# Patient Record
Sex: FEMALE | Race: WHITE | NOT HISPANIC OR LATINO | Employment: OTHER | URBAN - METROPOLITAN AREA
[De-identification: names, ages, dates, MRNs, and addresses within clinical notes are randomized per-mention and may not be internally consistent; named-entity substitution may affect disease eponyms.]

---

## 2024-07-29 ENCOUNTER — ANESTHESIA EVENT (OUTPATIENT)
Dept: PERIOP | Facility: HOSPITAL | Age: 79
End: 2024-07-29
Payer: MEDICARE

## 2024-07-29 ENCOUNTER — PRE-ADMISSION TESTING (OUTPATIENT)
Dept: PREADMISSION TESTING | Facility: HOSPITAL | Age: 79
End: 2024-07-29
Payer: MEDICARE

## 2024-07-29 VITALS
RESPIRATION RATE: 16 BRPM | BODY MASS INDEX: 23.9 KG/M2 | DIASTOLIC BLOOD PRESSURE: 77 MMHG | OXYGEN SATURATION: 99 % | SYSTOLIC BLOOD PRESSURE: 150 MMHG | TEMPERATURE: 97.9 F | WEIGHT: 140 LBS | HEART RATE: 62 BPM | HEIGHT: 64 IN

## 2024-07-29 LAB
ANION GAP SERPL CALCULATED.3IONS-SCNC: 11 MMOL/L (ref 5–15)
BUN SERPL-MCNC: 14 MG/DL (ref 8–23)
BUN/CREAT SERPL: 19.7 (ref 7–25)
CALCIUM SPEC-SCNC: 9.5 MG/DL (ref 8.6–10.5)
CHLORIDE SERPL-SCNC: 105 MMOL/L (ref 98–107)
CO2 SERPL-SCNC: 25 MMOL/L (ref 22–29)
CREAT SERPL-MCNC: 0.71 MG/DL (ref 0.57–1)
DEPRECATED RDW RBC AUTO: 43.3 FL (ref 37–54)
EGFRCR SERPLBLD CKD-EPI 2021: 87.7 ML/MIN/1.73
ERYTHROCYTE [DISTWIDTH] IN BLOOD BY AUTOMATED COUNT: 12.5 % (ref 12.3–15.4)
GLUCOSE SERPL-MCNC: 95 MG/DL (ref 65–99)
HCT VFR BLD AUTO: 42.5 % (ref 34–46.6)
HGB BLD-MCNC: 13.8 G/DL (ref 12–15.9)
MCH RBC QN AUTO: 30.9 PG (ref 26.6–33)
MCHC RBC AUTO-ENTMCNC: 32.5 G/DL (ref 31.5–35.7)
MCV RBC AUTO: 95.1 FL (ref 79–97)
PLATELET # BLD AUTO: 253 10*3/MM3 (ref 140–450)
PMV BLD AUTO: 10.8 FL (ref 6–12)
POTASSIUM SERPL-SCNC: 3.9 MMOL/L (ref 3.5–5.2)
QT INTERVAL: 419 MS
QTC INTERVAL: 405 MS
RBC # BLD AUTO: 4.47 10*6/MM3 (ref 3.77–5.28)
SODIUM SERPL-SCNC: 141 MMOL/L (ref 136–145)
WBC NRBC COR # BLD AUTO: 5.37 10*3/MM3 (ref 3.4–10.8)

## 2024-07-29 PROCEDURE — 80048 BASIC METABOLIC PNL TOTAL CA: CPT

## 2024-07-29 PROCEDURE — 85027 COMPLETE CBC AUTOMATED: CPT

## 2024-07-29 PROCEDURE — 93005 ELECTROCARDIOGRAM TRACING: CPT

## 2024-07-29 PROCEDURE — 36415 COLL VENOUS BLD VENIPUNCTURE: CPT

## 2024-07-29 RX ORDER — MULTIVIT WITH MINERALS/LUTEIN
250 TABLET ORAL DAILY
COMMUNITY

## 2024-07-29 RX ORDER — DIPHENOXYLATE HYDROCHLORIDE AND ATROPINE SULFATE 2.5; .025 MG/1; MG/1
1 TABLET ORAL DAILY
COMMUNITY

## 2024-07-29 RX ORDER — TRIAMCINOLONE ACETONIDE 55 UG/1
2 SPRAY, METERED NASAL DAILY PRN
COMMUNITY

## 2024-07-29 RX ORDER — GLUCOSAMINE/D3/BOSWELLIA SERRA 1500MG-400
1 TABLET ORAL DAILY
COMMUNITY

## 2024-07-29 RX ORDER — ESTRADIOL 0.1 MG/G
0.5 CREAM VAGINAL 2 TIMES WEEKLY
COMMUNITY
Start: 2024-05-06 | End: 2025-05-07

## 2024-07-29 RX ORDER — AMLODIPINE BESYLATE 5 MG/1
5 TABLET ORAL 2 TIMES DAILY
COMMUNITY
Start: 2024-06-19 | End: 2025-05-30

## 2024-07-29 RX ORDER — HYDROCHLOROTHIAZIDE 12.5 MG/1
1 TABLET ORAL DAILY
COMMUNITY
Start: 2024-02-21 | End: 2024-08-20

## 2024-07-29 RX ORDER — LEVOTHYROXINE SODIUM 75 MCG
75 TABLET ORAL
COMMUNITY
Start: 2024-02-26 | End: 2024-08-25

## 2024-07-29 RX ORDER — MULTIVITAMIN/IRON/FOLIC ACID 18MG-0.4MG
TABLET ORAL
COMMUNITY
End: 2024-07-29

## 2024-07-29 RX ORDER — OMEGA-3S/DHA/EPA/FISH OIL/D3 300MG-1000
400 CAPSULE ORAL DAILY
COMMUNITY

## 2024-07-29 RX ORDER — KETOCONAZOLE 20 MG/ML
1 SHAMPOO TOPICAL 3 TIMES WEEKLY
COMMUNITY
Start: 2024-07-20

## 2024-07-29 RX ORDER — GUAIFENESIN 600 MG/1
1200 TABLET, EXTENDED RELEASE ORAL DAILY PRN
COMMUNITY

## 2024-07-29 RX ORDER — ASPIRIN 81 MG/1
1 TABLET, CHEWABLE ORAL DAILY
COMMUNITY
Start: 2023-10-26 | End: 2024-10-26

## 2024-07-29 NOTE — ANESTHESIA PREPROCEDURE EVALUATION
Anesthesia Evaluation     Patient summary reviewed and Nursing notes reviewed   history of anesthetic complications:  prolonged sedation  NPO Solid Status: > 8 hours  NPO Liquid Status: > 2 hours           Airway   Mallampati: II  TM distance: >3 FB  Neck ROM: full  No difficulty expected  Dental - normal exam     Pulmonary - normal exam    breath sounds clear to auscultation  Cardiovascular - normal exam    ECG reviewed  Rhythm: regular  Rate: normal    (+) hypertension 2 medications or greater    ROS comment: Hx stroke in 10/21 due to probable PFO, positive bubble study by ECHO at [facility] in [place] in 10/21, closure not felt by cardiologist to be warranted at patient's age  PE comment: No murmurs heard    Neuro/Psych  (+) CVA residual symptoms, psychiatric history Anxiety    ROS Comment: Hx CVA in 10/21 felt due to a PFO (workup all done at [facility]),  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  GI/Hepatic/Renal/Endo    (+) renal disease- stones, thyroid problem hypothyroidism    Musculoskeletal     Abdominal  - normal exam   Substance History      OB/GYN      Comment: Uterovaginal prolapse      Other   arthritis,                   Anesthesia Plan    ASA 3     general     (Avoid bubbles in IV with hx CVA due to PFO in 2021, patient has had anesthesia since then without problems for cystoscopy and colonoscopy/consider TIVA or background Propofol drip for quicker emergence)  intravenous induction     Anesthetic plan, risks, benefits, and alternatives have been provided, discussed and informed consent has been obtained with: patient.      CODE STATUS:

## 2024-07-29 NOTE — DISCHARGE INSTRUCTIONS
Take the following medications the morning of surgery:  AMLODIPINE, SYNTHROID    ASK MD IF NEED TO STOP ASPIRIN    STOP SUPPLEMENTS/VITAMINS NOW UNTIL AFTER SURGERY    If you are on prescription narcotic pain medication to control your pain you may also take that medication the morning of surgery.    General Instructions:  Do not eat solid food after midnight the night before surgery.  You may drink clear liquids day of surgery but must stop at least one hour before your hospital arrival time.  It is beneficial for you to have a clear drink that contains carbohydrates the day of surgery.  We suggest a 12 to 20 ounce bottle of Gatorade or Powerade for non-diabetic patients or a 12 to 20 ounce bottle of G2 or Powerade Zero for diabetic patients. (Pediatric patients, are not advised to drink a 12 to 20 ounce carbohydrate drink)    Clear liquids are liquids you can see through.  Nothing red in color.     Plain water                                  Sports drinks  Sodas                                   Gelatin (Jell-O)  Fruit juices without pulp such as white grape juice and apple juice  Popsicles that contain no fruit or yogurt  Tea or coffee (no cream or milk added)  Gatorade / Powerade  G2 / Powerade Zero    Chicken / beef / pork / vegetable bullion / cubes or any formulation are not considered clear liquids and are not allowed.    Infants may have breast milk up to four hours before surgery.  Infants drinking formula may drink formula up to six hours before surgery.   Patients who avoid smoking, chewing tobacco and alcohol for 4 weeks prior to surgery have a reduced risk of post-operative complications.  Quit smoking as many days before surgery as you can.  Do not smoke, use chewing tobacco or drink alcohol the day of surgery.   If applicable bring your C-PAP/ BI-PAP machine in with you to preop day of surgery.  Bring any papers given to you in the doctor’s office.  Wear clean comfortable clothes.  Do not wear  contact lenses, false eyelashes or make-up.  Bring a case for your glasses.   Bring crutches or walker if applicable.  Remove all piercings.  Leave jewelry and any other valuables at home.  Hair extensions with metal clips must be removed prior to surgery.  The Pre-Admission Testing nurse will instruct you to bring medications if unable to obtain an accurate list in Pre-Admission Testing.      Preventing a Surgical Site Infection:  For 2 to 3 days before surgery, avoid shaving with a razor because the razor can irritate skin and make it easier to develop an infection.    Any areas of open skin can increase the risk of a post-operative wound infection by allowing bacteria to enter and travel throughout the body.  Notify your surgeon if you have any skin wounds / rashes even if it is not near the expected surgical site.  The area will need assessed to determine if surgery should be delayed until it is healed.  The night prior to surgery shower using a fresh bar of anti-bacterial soap (such as Dial) and clean washcloth.  Sleep in a clean bed with clean clothing.  Do not allow pets to sleep with you.  Shower on the morning of surgery using a fresh bar of anti-bacterial soap (such as Dial) and clean washcloth.  Dry with a clean towel and dress in clean clothing.  Ask your surgeon if you will be receiving antibiotics prior to surgery.  Make sure you, your family, and all healthcare providers clean their hands with soap and water or an alcohol based hand  before caring for you or your wound.    Day of surgery:  Your arrival time is approximately two hours before your scheduled surgery time.  Upon arrival, a Pre-op nurse and Anesthesiologist will review your health history, obtain vital signs, and answer questions you may have.  The only belongings needed at this time will be a list of your home medications and if applicable your C-PAP/BI-PAP machine.  A Pre-op nurse will start an IV and you may receive medication  in preparation for surgery, including something to help you relax.     Please be aware that surgery does come with discomfort.  We want to make every effort to control your discomfort so please discuss any uncontrolled symptoms with your nurse.   Your doctor will most likely have prescribed pain medications.      If you are going home after surgery you will receive individualized written care instructions before being discharged.  A responsible adult must drive you to and from the hospital on the day of your surgery and ideally stay with you through the night.   .  Discharge prescriptions can be filled by the hospital pharmacy during regular pharmacy hours.  If you are having surgery late in the day/evening your prescription may be e-prescribed to your pharmacy.  Please verify your pharmacy hours or chose a 24 hour pharmacy to avoid not having access to your prescription because your pharmacy has closed for the day.    If you are staying overnight following surgery, you will be transported to your hospital room following the recovery period.  [facility] has all private rooms.    If you have any questions please call Pre-Admission Testing at [phone].  Deductibles and co-payments are collected on the day of service. Please be prepared to pay the required co-pay, deductible or deposit on the day of service as defined by your plan.    Call your surgeon immediately if you experience any of the following symptoms:  Sore Throat  Shortness of Breath or difficulty breathing  Cough  Chills  Body soreness or muscle pain  Headache  Fever  New loss of taste or smell  Do not arrive for your surgery ill.  Your procedure will need to be rescheduled to another time.  You will need to call your physician before the day of surgery to avoid any unnecessary exposure to hospital staff as well as other patients.

## 2024-08-07 NOTE — DISCHARGE INSTRUCTIONS
Female Pelvic Medicine & Reconstructive Surgery (Urogynecology)     POST OPERATIVE INSTRUCTIONS & RECOVERY    You may have had reconstructive pelvic surgery and it is now your turn to play an important role in its success. We know that it will take about 12 weeks for the tissues that have been operated on to heal to 80% of their final strength. Then it may take months or even up to two years for this healing to be complete. Major risk factors for tearing down the surgical repair in the first weeks or months are constipation and heavy lifting. Our recommendations below are to try to prevent this from happening.  The staff at [facility] is committed to ensuring that your post-operative experience is as comfortable as possible. Please do not hesitate to call the office for any questions after recovery. Any questions regarding your surgery or post-operative recovery should be directed to the staff at [facility] rather than your Primary Care Physician (PCP). The following information will help answer the frequently asked questions and will help you understand some of the common experiences that may occur after your surgery.    What should I expect immediately after surgery?    Activity  Take it easy for the first few weeks after surgery; you may need assistance the first few days. Some people notice slight depression after surgery. This will resolve on its own, but please call us if it does not.  You may walk as much as you would like and go up and down stairs, although in the beginning you may need to take one step at a time. Avoid jogging, aerobics, swimming, and biking. Avoid heavy pushing or pulling, including vacuuming and lifting pets/children/grandchildren.  You can return to work 4 to 6 weeks after surgery depending on your requirements for lifting and standing.   Heavy lifting is a major risk factor for tearing down your repair. You should not lift anything heavier than a gallon of milk (8 to 9  pounds) for the first six weeks. You may gradually increase your lifting to 20 pounds six to twelve weeks after surgery.  You may drive your car as soon as you feel ready and can safely react/turn your body, but not while you are taking narcotic medication.    Nutrition  Resume your normal diet after surgery. Eat a well-balanced diet. Drink six to eight glasses of non-caffeinated beverages daily. Water is especially recommended.   Resume all of your prior medications once you are home unless otherwise instructed by your doctor.     Abdominal incision care  If you have an abdominal incision, clean the incision with water. Incisions for laparoscopy are usually ¼ to ½ inch in size. You may have 2 to 3 small incisions with one larger ¾ inch incision OR you may only have 3 to 4 small incisions.   The incisions will have absorbent sutures, steri-strips, band-aids and/or skin glue. The band-aids may be removed the day after surgery. The steri-strips can be kept on until they curl at the edges. The sutures will absorb by themselves. If your skin is closed with staples they must be removed 7-10 days after surgery. Call the office to schedule an appointment for this procedure.    Hygiene, Abdominal incision care, and Vaginal discharge  Shower as usual. If you have an abdominal incision, clean the incision with water. Do not bathe in the bathtub until you have been given permission, usually after you see your surgeon at your 6-week post-op appointment. Keep the incision area clean and dry. Do not use a dressing unless your incision is draining or irritated.  You may notice an abnormal discharge or drainage. It is normal for discharge to go from red to pink to yellow as your sutures dissolve. If the discharge is foul-smelling please call our office.  Keep the perineal area (between the vagina and the rectum) clean and dry. Clean after every bowel movement and each time you urinate. Rinsing with plain water or a sitz-bath may be  helpful.  Use maxi pads - not tampons - to absorb blood or discharge. Avoid douching. You may notice vaginal spotting for up to 6 weeks. If you have bleeding that is heavy enough to soak through two maxi pads for two hours in a row call our office. Keep track of when the bleeding began and how many pads you have used.    Bowel Movements  Constipation can cause severe pain that can get worse with increased amounts of medication. Narcotics can make you constipated. Although we recognize that, depending on the extent of your surgery you may need to use narcotics, please be aware that they can cause constipation.  Constipation is also a major risk factor in tearing down of the surgical repair. We want to avoid constipation. Preventing constipation is much easier than treating it once it happens.  Do not strain during bowel movements.  If you experience constipation, make sure you are drinking plenty of water and eating a high fiber diet. A high fiber diet includes fruits, vegetables, and bran. Your doctor may have also recommended a fiber supplement like Benefiber, Metamucil, Citrucel, or Fibercon. Take this as directed once you are home from the hospital.  If you commonly experience constipation,  over-the-counter MiraLAX (you can use a generic version of polyethylene glycol as well). Please take this daily. If your stool gets too loose while using MiraLAX you can decrease using the medication every other day or every third day. Adjust the MiraLAX dose according to what your need for a regular bowel movement without straining.  If you start to feel constipated you can take MiraLAX once in the morning and once at night until you have your first bowel movement. Alternatively, you may also use a mild laxative such as Milk of Magnesia or a stool softener such as Colace®. In addition, make sure that you are getting enough activity during the day. No prescription is required for these medications.     Voiding /  Urination  Empty your bladder every two to three hours while you are awake, whether you feel like you need to or not. Sometimes surgery can change your sensation of fullness and you may not feel like you need to void. We want to avoid your bladder becoming too full.  If you have any special bladder instructions, you will be given them in the hospital. If you are unsure about these instructions call the office.  Call the office if you begin to experience any bladder problems. These problems may include urgency, frequency or pain with urination.     Sexual Weir  You should not have intercourse for a minimum of 6 weeks after surgery. Your doctor will tell you when you can resume sexual intercourse at your post-op visit.  You should not put anything in the vagina for six weeks after surgery. The only exceptions are if your doctor told you to use vaginal estrogen cream or pills. If you are using vaginal estrogen cream or pills, you can resume using those when you get home.    Fever  If you feel feverish or having shaking chills, take your temperature.  If your temperature is 100.4°F twice in a row, or if your temperature is 101°F or higher one time, call our office. This may mean that you have an infection and we will want to know about that.    Pain control  Most women will require pain medication after surgery. If you are comfortable you will be a little more active, which will help recovery.  Follow your doctor's instructions regarding pain medication. Because the narcotics can cause constipation, we encourage you to take over the counter medications like Tylenol (generic name: acetaminophen) or ibuprofen when you are able. If you are unsure if you are able to take these medications then ask your doctor about using them before your use it. Make sure that you do not take more than the recommended daily limit.   You may use a heating pad on your abdomen to relieve gas pain.      Please notify our office if any of  the following occurs:  Increased pain, redness, discharge, hardness, or swelling at the incision.  Pain or burning with urination  Vaginal bleeding that soaks more than 1 maxi pad per hour for two hours in a row  Fever greater than 101°F   Persistent nausea, vomiting or inability to have bowel movements.  Shortness of breath, calf pain or swelling in your extremities    Please call the office to schedule your post-operative visit for about 6 weeks after your surgery. You may also see your doctor again about 3 to 6 months and/or 1 year after your surgery. Additional appointments can and will be made based upon need.     **If you leave the hospital with a bladder catheter in place call the office to schedule an appointment to take place 1 week after your surgery **    Please call with any questions or concerns.   [phone] Monday to Friday 8AM to 5PM  or [phone] after work hours and during holidays    Dr. Walter Madrid          Scopolamine Patch  This patch has been applied to the skin behind one of your ears.  It may stay in place up to 24 hours. You may remove it at any time after your surgery; however, it should be removed after you are up and walking around the next day.  This medicine reduces stomach upset. Side effects may include: dry mouth, dizziness, sleepiness, constipation, or upset stomach.  An allergy would show up as: a rash, itching, wheezing or shortness of breath.  Follow these instructions:  Do not drink alcohol, drive or operate machinery while taking this medicine.  Wear only 1 patch at a time. You can leave the patch on for up to 24 hours.  When you remove the patch, fold it in half with the sticky sides together and throw it away. Wash your hands and the area under the patch.  Do not touch your eye with your hand if it has touched the patch.  Wash your hands well before and after touching the patch.  Sit or stand slowly to avoid dizziness.  Call your doctor if you  have:  Any sign of allergy  No relief  Trouble passing urine  Any new or severe symptoms

## 2024-08-07 NOTE — H&P
"[facility]   HISTORY AND PHYSICAL    Patient Name: Jerilyn Villela  : 1945  MRN: [id number]  Primary Care Physician:  Bertha Hernández MD  Date of admission: (Not on file)    Subjective   Subjective     Chief Complaint: here for surgery     History of Present Illness  HPI:   Jerilyn Villela is a 79 y.o.  female with POP-Q Stage 2 anterior predominant uterovaginal prolapse and JAY here for surgery. She is scheduled for a total vaginal hysterectomy, high USLS, anterior colporrhaphy, posterior colporrhaphy, retropubic synthetic midurethral sling, and cystoscopy today at [facility].     Medical history is significant for Stroke (10/2021, No residual symptoms - On ASA), HTN, HLD, Patent Foramen Ovale, Knee pain, Hypothyroidism, Diverticulosis.   Surgical history is significant for BTL (), Lithotripsy (), Left Meniscus Surgery (), Sinusitis Surgery ().      Changes to health history since last visit: None      Pap history: patient does not recall when last pap was, no history of abnormal pap smears   Imaging: \"none\"   EMB: \" n/a\"   Other Clearance: \" n/a\"     [facility]25/24[facility]  POP-Q Stage II Prolapse, using 4 Dish Pessary      Review of Systems   Constitutional: Negative.    HENT: Negative.     Eyes: Negative.    Respiratory: Negative.     Cardiovascular: Negative.    Gastrointestinal: Negative.    All other systems reviewed and are negative.       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 Otherwise pertinent FHx was reviewed and not pertinent to current issue.    Social History:  reports that she has never smoked. She has never used smokeless tobacco. She reports current alcohol use. She reports that she does not currently use drugs.    Home Medications:  Biotin, Potassium Chloride, Triamcinolone Acetonide, Zinc Oxide, amLODIPine, aspirin, cholecalciferol, estradiol, guaiFENesin, hydroCHLOROthiazide, ketoconazole, levothyroxine, multivitamin, and vitamin C    Allergies:  Allergies   Allergen Reactions    Lisinopril Other (See Comments)     Throat tightness    Penicillins Rash     07/12/2005-PENICILLIN       Objective    Objective     Vitals:      General:  Patient is a healthy appearing female who is well developed, well nourished in no acute distress.    Eyes:  Pupils equal, round. Sclera anicteric.   HEENT:  Normocephalic, atraumatic.    Chest:  Lungs clear to auscultation bilaterally. Normal respiratory effort.   Heart: Regular rate and rhythm. Normal peripheral vascular exam.   Abdomen:  Soft and non-tender, no masses, no hernia. No CVA tenderness. Tenderness to palpation medial to ASIS: No. No suprapubic tenderness to palpation. Location of scars: none  BACK: No SI joint tenderness.   Skin: No rashes or lesions.   Musculoskeletal:  Normal strength. No edema.   Neurologic:  Alert, nonfocal.   Psychiatric:  Normal affect and mood, oriented x 3.      Genitourinary Exam:    External Genitalia:  Normal appearance, no lesions.     Urethral Meatus: normal size and location.   Urethra:  No masses or tenderness.   Bladder:  No masses or tenderness.   Vagina: atrophic changes noted.   Cervix: prolapse see POPQ.  Uterus: prolapse see POPQ.   Adnexa/Parametrial:  No palpable masses, no tenderness.    Perineal Sensation:  normal.   Anal [place]: present.   Rectal Exam: not performed.   JAY was not demonstrable with a cough stress test in the lithotomy position, approximately 10 minutes after her last spontaneous void.      POPQ Examination: 2024     Aa  +1     Ba  +1    C  -3   ------------------------------------   GH  4    PB  3    TVL 9   ------------------------------------   Ap  0     Bp  0    D  -4      POP-Q Stage 2     Pelvic muscle (obturator internus & levator ani) assessment   Retropubic L: 0/10   Retropubic R: 0/10   Right OI: 0/10   Right LA: 0/10   Left LA: 0/10   Left OI:  0/10      Pelvic Floor Contraction Strength (Oxford Scale) Muscle Strength Grading   Grade 3: Moderate contraction with some squeeze and lift ability      The sensitive parts of the examination were performed with Logan Hernandez CMA as a chaperone.        PVR  Voided Volume: 39 ml   PVR: 0 ml by bladder scan        Assessment & Plan   Assessment / Plan        ASSESSMENT:   Jerilyn Villela is a 79 y.o.  female with POP-Q Stage 2 anterior predominant uterovaginal prolapse and stress urinary incontinence.      PLAN:   Patient scheduled for total vaginal hysterectomy, possible bilateral salpingoophorectomy, high USLS, anterior colporrhaphy, posterior colporrhaphy, retropubic synthetic midurethral sling, and cystoscopy.   -Today she was allowed the opportunity to ask and discuss all of her questions and concerns related to before, during, and after surgery.  -Post operative expectations and restrictions reviewed in detail. Handout provided to patient to take home for reference.  -We discussed surgical risks, which include, but are not limited to, pain, bleeding, infection, injury to nearby organs including bowel, bladder, ureters, urethra, nerves and blood vessels, as well as the possibility of persistent or recurrent symptoms which may require future surgery.   -We discussed additional surgical risks of possible large abdominal incision,  prolonged hospitalization, prolonged catheterization, deep vein thrombosis, and death.  -We further discussed the risk of postoperative dyspareunia or pelvic pain, and the potential for postoperative voiding dysfunction. Specifically, we discussed the risk of persistent or recurrent postoperative incontinence, or alternately postoperative urinary retention, either of which may require future surgical correction. Some pelvic surgeries have a risk of severe postoperative pelvic pain that can last several weeks for which may require removal of the suture or undoing of the surgery.   -Discussed the possibility of need for prajapati catheter post operatively if the patient is unable to void completely after surgery.   -If post operative voiding dysfunction after sling does not improve she may need urethrolysis or possible take down of the sling.  -With vaginal surgery scarring, narrowing, or shortening of the vagina may occur.  -With respect to permanent polypropylene mesh placement, we reviewed the recent FDA communications and the ~4% risk of a mesh-related complication related to this procedure, which could include mesh erosion into the vagina, bladder, urethra, ureters, or bowel, or alternately pelvic pain or dyspareunia, which may require future surgery. Surgery for any complications may or may not correct the complication. The patient was amenable to these risks, and wishes to proceed.   -Patient has been given a copy of mesh information from the .  -Bowel function is unpredictable postoperatively; surgery may help, not change, or even worsen any present symptoms. In the setting of prior constipation, we recommend starting a bowel regimen prior to surgery.  -FPMRS post operative instructions/hand out provided to patient.  -Consents were signed. She is to meet with PAT for anesthesia counseling and consent.      Krystyna Nesbitt MD  Attending: Dr. Walter Fuentes MD  I have reviewed the attached history and  physical and there are no updates or changes to history and or physical.

## 2024-08-08 ENCOUNTER — ANESTHESIA (OUTPATIENT)
Dept: PERIOP | Facility: HOSPITAL | Age: 79
End: 2024-08-08
Payer: MEDICARE

## 2024-08-08 ENCOUNTER — HOSPITAL ENCOUNTER (OUTPATIENT)
Facility: HOSPITAL | Age: 79
Setting detail: OBSERVATION
Discharge: HOME | End: 2024-08-09
Attending: OBSTETRICS & GYNECOLOGY | Admitting: STUDENT IN AN ORGANIZED HEALTH CARE EDUCATION/TRAINING PROGRAM
Payer: MEDICARE

## 2024-08-08 DIAGNOSIS — G89.18 ACUTE POSTOPERATIVE PAIN: Primary | ICD-10-CM

## 2024-08-08 DIAGNOSIS — N81.4 UTEROVAGINAL PROLAPSE: ICD-10-CM

## 2024-08-08 LAB
ABO GROUP BLD: NORMAL
ANION GAP SERPL CALCULATED.3IONS-SCNC: 15 MMOL/L (ref 5–15)
BASOPHILS # BLD AUTO: 0.02 10*3/MM3 (ref 0–0.2)
BASOPHILS NFR BLD AUTO: 0.2 % (ref 0–1.5)
BLD GP AB SCN SERPL QL: POSITIVE
BLD GP AB SCN SERPL QL: POSITIVE
BUN SERPL-MCNC: 6 MG/DL (ref 8–23)
BUN/CREAT SERPL: 10.5 (ref 7–25)
CALCIUM SPEC-SCNC: 8.3 MG/DL (ref 8.6–10.5)
CHLORIDE SERPL-SCNC: 104 MMOL/L (ref 98–107)
CO2 SERPL-SCNC: 21 MMOL/L (ref 22–29)
CREAT SERPL-MCNC: 0.57 MG/DL (ref 0.57–1)
DAT C3: NEGATIVE
DAT IGG-SP REAG RBC-IMP: POSITIVE
DAT POLY-SP REAG RBC QL: POSITIVE
DEPRECATED RDW RBC AUTO: 40.3 FL (ref 37–54)
EGFRCR SERPLBLD CKD-EPI 2021: 93.7 ML/MIN/1.73
EOSINOPHIL # BLD AUTO: 0 10*3/MM3 (ref 0–0.4)
EOSINOPHIL NFR BLD AUTO: 0 % (ref 0.3–6.2)
ERYTHROCYTE [DISTWIDTH] IN BLOOD BY AUTOMATED COUNT: 11.9 % (ref 12.3–15.4)
GLUCOSE SERPL-MCNC: 145 MG/DL (ref 65–99)
HCT VFR BLD AUTO: 36.7 % (ref 34–46.6)
HGB BLD-MCNC: 12.6 G/DL (ref 12–15.9)
IMM GRANULOCYTES # BLD AUTO: 0.06 10*3/MM3 (ref 0–0.05)
IMM GRANULOCYTES NFR BLD AUTO: 0.5 % (ref 0–0.5)
LYMPHOCYTES # BLD AUTO: 0.25 10*3/MM3 (ref 0.7–3.1)
LYMPHOCYTES NFR BLD AUTO: 2 % (ref 19.6–45.3)
MCH RBC QN AUTO: 32 PG (ref 26.6–33)
MCHC RBC AUTO-ENTMCNC: 34.3 G/DL (ref 31.5–35.7)
MCV RBC AUTO: 93.1 FL (ref 79–97)
MONOCYTES # BLD AUTO: 0.31 10*3/MM3 (ref 0.1–0.9)
MONOCYTES NFR BLD AUTO: 2.5 % (ref 5–12)
NEUTROPHILS NFR BLD AUTO: 11.98 10*3/MM3 (ref 1.7–7)
NEUTROPHILS NFR BLD AUTO: 94.8 % (ref 42.7–76)
NRBC BLD AUTO-RTO: 0 /100 WBC (ref 0–0.2)
PLATELET # BLD AUTO: 238 10*3/MM3 (ref 140–450)
PMV BLD AUTO: 10.9 FL (ref 6–12)
POTASSIUM SERPL-SCNC: 3.3 MMOL/L (ref 3.5–5.2)
RBC # BLD AUTO: 3.94 10*6/MM3 (ref 3.77–5.28)
RH BLD: POSITIVE
SODIUM SERPL-SCNC: 140 MMOL/L (ref 136–145)
T&S EXPIRATION DATE: NORMAL
WARM AUTOANTIBODY: NORMAL
WBC NRBC COR # BLD AUTO: 12.62 10*3/MM3 (ref 3.4–10.8)

## 2024-08-08 PROCEDURE — 25010000002 HYDROMORPHONE PER 4 MG: Performed by: NURSE ANESTHETIST, CERTIFIED REGISTERED

## 2024-08-08 PROCEDURE — 86921 COMPATIBILITY TEST INCUBATE: CPT

## 2024-08-08 PROCEDURE — 25010000002 PROPOFOL 10 MG/ML EMULSION: Performed by: NURSE ANESTHETIST, CERTIFIED REGISTERED

## 2024-08-08 PROCEDURE — A9270 NON-COVERED ITEM OR SERVICE: HCPCS | Performed by: STUDENT IN AN ORGANIZED HEALTH CARE EDUCATION/TRAINING PROGRAM

## 2024-08-08 PROCEDURE — 63710000001 ACETAMINOPHEN EXTRA STRENGTH 500 MG TABLET: Performed by: STUDENT IN AN ORGANIZED HEALTH CARE EDUCATION/TRAINING PROGRAM

## 2024-08-08 PROCEDURE — 25010000002 KETOROLAC TROMETHAMINE PER 15 MG: Performed by: OBSTETRICS & GYNECOLOGY

## 2024-08-08 PROCEDURE — 63710000001 FAMOTIDINE 20 MG TABLET: Performed by: STUDENT IN AN ORGANIZED HEALTH CARE EDUCATION/TRAINING PROGRAM

## 2024-08-08 PROCEDURE — 25010000002 FENTANYL CITRATE (PF) 50 MCG/ML SOLUTION: Performed by: NURSE ANESTHETIST, CERTIFIED REGISTERED

## 2024-08-08 PROCEDURE — C1758 CATHETER, URETERAL: HCPCS | Performed by: OBSTETRICS & GYNECOLOGY

## 2024-08-08 PROCEDURE — 88307 TISSUE EXAM BY PATHOLOGIST: CPT | Performed by: OBSTETRICS & GYNECOLOGY

## 2024-08-08 PROCEDURE — 86880 COOMBS TEST DIRECT: CPT | Performed by: STUDENT IN AN ORGANIZED HEALTH CARE EDUCATION/TRAINING PROGRAM

## 2024-08-08 PROCEDURE — 63710000001 POTASSIUM CHLORIDE 10 MEQ TABLET CONTROLLED-RELEASE: Performed by: OBSTETRICS & GYNECOLOGY

## 2024-08-08 PROCEDURE — 86901 BLOOD TYPING SEROLOGIC RH(D): CPT | Performed by: STUDENT IN AN ORGANIZED HEALTH CARE EDUCATION/TRAINING PROGRAM

## 2024-08-08 PROCEDURE — 25010000002 ONDANSETRON PER 1 MG: Performed by: NURSE ANESTHETIST, CERTIFIED REGISTERED

## 2024-08-08 PROCEDURE — G0378 HOSPITAL OBSERVATION PER HR: HCPCS

## 2024-08-08 PROCEDURE — 25810000003 LACTATED RINGERS PER 1000 ML: Performed by: NURSE ANESTHETIST, CERTIFIED REGISTERED

## 2024-08-08 PROCEDURE — 80048 BASIC METABOLIC PNL TOTAL CA: CPT | Performed by: STUDENT IN AN ORGANIZED HEALTH CARE EDUCATION/TRAINING PROGRAM

## 2024-08-08 PROCEDURE — C1771 REP DEV, URINARY, W/SLING: HCPCS | Performed by: OBSTETRICS & GYNECOLOGY

## 2024-08-08 PROCEDURE — A9270 NON-COVERED ITEM OR SERVICE: HCPCS | Performed by: OBSTETRICS & GYNECOLOGY

## 2024-08-08 PROCEDURE — 25810000003 LACTATED RINGERS PER 1000 ML: Performed by: ANESTHESIOLOGY

## 2024-08-08 PROCEDURE — 25010000002 DEXAMETHASONE PER 1 MG: Performed by: NURSE ANESTHETIST, CERTIFIED REGISTERED

## 2024-08-08 PROCEDURE — 25010000002 EPINEPHRINE PER 0.1 MG: Performed by: OBSTETRICS & GYNECOLOGY

## 2024-08-08 PROCEDURE — 86920 COMPATIBILITY TEST SPIN: CPT

## 2024-08-08 PROCEDURE — 86870 RBC ANTIBODY IDENTIFICATION: CPT | Performed by: STUDENT IN AN ORGANIZED HEALTH CARE EDUCATION/TRAINING PROGRAM

## 2024-08-08 PROCEDURE — 25010000002 GLYCOPYRROLATE 0.2 MG/ML SOLUTION: Performed by: NURSE ANESTHETIST, CERTIFIED REGISTERED

## 2024-08-08 PROCEDURE — 25010000002 SUGAMMADEX 200 MG/2ML SOLUTION: Performed by: NURSE ANESTHETIST, CERTIFIED REGISTERED

## 2024-08-08 PROCEDURE — 25010000002 CEFOXITIN PER 1 G: Performed by: NURSE ANESTHETIST, CERTIFIED REGISTERED

## 2024-08-08 PROCEDURE — 86922 COMPATIBILITY TEST ANTIGLOB: CPT

## 2024-08-08 PROCEDURE — 86850 RBC ANTIBODY SCREEN: CPT | Performed by: STUDENT IN AN ORGANIZED HEALTH CARE EDUCATION/TRAINING PROGRAM

## 2024-08-08 PROCEDURE — 25010000002 CEFOXITIN PER 1 G: Performed by: STUDENT IN AN ORGANIZED HEALTH CARE EDUCATION/TRAINING PROGRAM

## 2024-08-08 PROCEDURE — 85025 COMPLETE CBC W/AUTO DIFF WBC: CPT | Performed by: OBSTETRICS & GYNECOLOGY

## 2024-08-08 PROCEDURE — 86900 BLOOD TYPING SEROLOGIC ABO: CPT | Performed by: STUDENT IN AN ORGANIZED HEALTH CARE EDUCATION/TRAINING PROGRAM

## 2024-08-08 DEVICE — TRANSVAGINAL MID-URETHRAL SLING
Type: IMPLANTABLE DEVICE | Site: VAGINA | Status: FUNCTIONAL
Brand: ADVANTAGE FIT™  SYSTEM

## 2024-08-08 RX ORDER — KETOROLAC TROMETHAMINE 15 MG/ML
15 INJECTION, SOLUTION INTRAMUSCULAR; INTRAVENOUS ONCE
Status: COMPLETED | OUTPATIENT
Start: 2024-08-08 | End: 2024-08-08

## 2024-08-08 RX ORDER — SODIUM CHLORIDE, SODIUM LACTATE, POTASSIUM CHLORIDE, CALCIUM CHLORIDE 600; 310; 30; 20 MG/100ML; MG/100ML; MG/100ML; MG/100ML
125 INJECTION, SOLUTION INTRAVENOUS CONTINUOUS
Status: DISCONTINUED | OUTPATIENT
Start: 2024-08-08 | End: 2024-08-09 | Stop reason: HOSPADM

## 2024-08-08 RX ORDER — FENTANYL CITRATE 50 UG/ML
50 INJECTION, SOLUTION INTRAMUSCULAR; INTRAVENOUS ONCE AS NEEDED
Status: DISCONTINUED | OUTPATIENT
Start: 2024-08-08 | End: 2024-08-08 | Stop reason: HOSPADM

## 2024-08-08 RX ORDER — MIDAZOLAM HYDROCHLORIDE 1 MG/ML
0.5 INJECTION INTRAMUSCULAR; INTRAVENOUS
Status: DISCONTINUED | OUTPATIENT
Start: 2024-08-08 | End: 2024-08-08 | Stop reason: HOSPADM

## 2024-08-08 RX ORDER — SODIUM CHLORIDE 0.9 % (FLUSH) 0.9 %
10 SYRINGE (ML) INJECTION EVERY 12 HOURS SCHEDULED
Status: DISCONTINUED | OUTPATIENT
Start: 2024-08-08 | End: 2024-08-09 | Stop reason: HOSPADM

## 2024-08-08 RX ORDER — ROCURONIUM BROMIDE 10 MG/ML
INJECTION, SOLUTION INTRAVENOUS AS NEEDED
Status: DISCONTINUED | OUTPATIENT
Start: 2024-08-08 | End: 2024-08-08 | Stop reason: SURG

## 2024-08-08 RX ORDER — PROMETHAZINE HYDROCHLORIDE 25 MG/1
25 SUPPOSITORY RECTAL ONCE AS NEEDED
Status: DISCONTINUED | OUTPATIENT
Start: 2024-08-08 | End: 2024-08-08 | Stop reason: HOSPADM

## 2024-08-08 RX ORDER — GLYCOPYRROLATE 0.2 MG/ML
INJECTION INTRAMUSCULAR; INTRAVENOUS AS NEEDED
Status: DISCONTINUED | OUTPATIENT
Start: 2024-08-08 | End: 2024-08-08 | Stop reason: SURG

## 2024-08-08 RX ORDER — FAMOTIDINE 10 MG/ML
20 INJECTION, SOLUTION INTRAVENOUS ONCE
Status: DISCONTINUED | OUTPATIENT
Start: 2024-08-08 | End: 2024-08-08 | Stop reason: HOSPADM

## 2024-08-08 RX ORDER — LIDOCAINE HYDROCHLORIDE 10 MG/ML
0.5 INJECTION, SOLUTION INFILTRATION; PERINEURAL ONCE AS NEEDED
Status: DISCONTINUED | OUTPATIENT
Start: 2024-08-08 | End: 2024-08-08 | Stop reason: HOSPADM

## 2024-08-08 RX ORDER — SODIUM CHLORIDE 0.9 % (FLUSH) 0.9 %
3-10 SYRINGE (ML) INJECTION AS NEEDED
Status: DISCONTINUED | OUTPATIENT
Start: 2024-08-08 | End: 2024-08-08 | Stop reason: HOSPADM

## 2024-08-08 RX ORDER — HYDROCODONE BITARTRATE AND ACETAMINOPHEN 7.5; 325 MG/1; MG/1
1 TABLET ORAL EVERY 4 HOURS PRN
Status: DISCONTINUED | OUTPATIENT
Start: 2024-08-08 | End: 2024-08-08 | Stop reason: HOSPADM

## 2024-08-08 RX ORDER — DROPERIDOL 2.5 MG/ML
0.62 INJECTION, SOLUTION INTRAMUSCULAR; INTRAVENOUS
Status: DISCONTINUED | OUTPATIENT
Start: 2024-08-08 | End: 2024-08-08 | Stop reason: HOSPADM

## 2024-08-08 RX ORDER — LABETALOL HYDROCHLORIDE 5 MG/ML
5 INJECTION, SOLUTION INTRAVENOUS
Status: DISCONTINUED | OUTPATIENT
Start: 2024-08-08 | End: 2024-08-08 | Stop reason: HOSPADM

## 2024-08-08 RX ORDER — NALOXONE HCL 0.4 MG/ML
0.4 VIAL (ML) INJECTION
Status: DISCONTINUED | OUTPATIENT
Start: 2024-08-08 | End: 2024-08-09 | Stop reason: HOSPADM

## 2024-08-08 RX ORDER — CEFOXITIN 2 G/1
INJECTION, POWDER, FOR SOLUTION INTRAVENOUS AS NEEDED
Status: DISCONTINUED | OUTPATIENT
Start: 2024-08-08 | End: 2024-08-08 | Stop reason: SURG

## 2024-08-08 RX ORDER — DIPHENHYDRAMINE HYDROCHLORIDE 50 MG/ML
12.5 INJECTION INTRAMUSCULAR; INTRAVENOUS
Status: DISCONTINUED | OUTPATIENT
Start: 2024-08-08 | End: 2024-08-08 | Stop reason: HOSPADM

## 2024-08-08 RX ORDER — DEXAMETHASONE SODIUM PHOSPHATE 4 MG/ML
INJECTION, SOLUTION INTRA-ARTICULAR; INTRALESIONAL; INTRAMUSCULAR; INTRAVENOUS; SOFT TISSUE AS NEEDED
Status: DISCONTINUED | OUTPATIENT
Start: 2024-08-08 | End: 2024-08-08 | Stop reason: SURG

## 2024-08-08 RX ORDER — SODIUM CHLORIDE 0.9 % (FLUSH) 0.9 %
10 SYRINGE (ML) INJECTION AS NEEDED
Status: DISCONTINUED | OUTPATIENT
Start: 2024-08-08 | End: 2024-08-09 | Stop reason: HOSPADM

## 2024-08-08 RX ORDER — DEXTROSE MONOHYDRATE 25 G/50ML
INJECTION, SOLUTION INTRAVENOUS AS NEEDED
Status: DISCONTINUED | OUTPATIENT
Start: 2024-08-08 | End: 2024-08-08 | Stop reason: HOSPADM

## 2024-08-08 RX ORDER — HYDROMORPHONE HYDROCHLORIDE 1 MG/ML
0.25 INJECTION, SOLUTION INTRAMUSCULAR; INTRAVENOUS; SUBCUTANEOUS
Status: DISCONTINUED | OUTPATIENT
Start: 2024-08-08 | End: 2024-08-08 | Stop reason: HOSPADM

## 2024-08-08 RX ORDER — SODIUM CHLORIDE 0.9 % (FLUSH) 0.9 %
3 SYRINGE (ML) INJECTION EVERY 12 HOURS SCHEDULED
Status: DISCONTINUED | OUTPATIENT
Start: 2024-08-08 | End: 2024-08-08 | Stop reason: HOSPADM

## 2024-08-08 RX ORDER — FENTANYL CITRATE 50 UG/ML
25 INJECTION, SOLUTION INTRAMUSCULAR; INTRAVENOUS
Status: DISCONTINUED | OUTPATIENT
Start: 2024-08-08 | End: 2024-08-08 | Stop reason: HOSPADM

## 2024-08-08 RX ORDER — ONDANSETRON 2 MG/ML
INJECTION INTRAMUSCULAR; INTRAVENOUS AS NEEDED
Status: DISCONTINUED | OUTPATIENT
Start: 2024-08-08 | End: 2024-08-08 | Stop reason: SURG

## 2024-08-08 RX ORDER — IPRATROPIUM BROMIDE AND ALBUTEROL SULFATE 2.5; .5 MG/3ML; MG/3ML
3 SOLUTION RESPIRATORY (INHALATION) ONCE AS NEEDED
Status: DISCONTINUED | OUTPATIENT
Start: 2024-08-08 | End: 2024-08-08 | Stop reason: HOSPADM

## 2024-08-08 RX ORDER — LIDOCAINE HYDROCHLORIDE 20 MG/ML
INJECTION, SOLUTION INFILTRATION; PERINEURAL AS NEEDED
Status: DISCONTINUED | OUTPATIENT
Start: 2024-08-08 | End: 2024-08-08 | Stop reason: SURG

## 2024-08-08 RX ORDER — SODIUM CHLORIDE, SODIUM LACTATE, POTASSIUM CHLORIDE, CALCIUM CHLORIDE 600; 310; 30; 20 MG/100ML; MG/100ML; MG/100ML; MG/100ML
INJECTION, SOLUTION INTRAVENOUS CONTINUOUS PRN
Status: DISCONTINUED | OUTPATIENT
Start: 2024-08-08 | End: 2024-08-08 | Stop reason: SURG

## 2024-08-08 RX ORDER — ONDANSETRON 2 MG/ML
4 INJECTION INTRAMUSCULAR; INTRAVENOUS ONCE AS NEEDED
Status: COMPLETED | OUTPATIENT
Start: 2024-08-08 | End: 2024-08-08

## 2024-08-08 RX ORDER — SODIUM CHLORIDE, SODIUM LACTATE, POTASSIUM CHLORIDE, CALCIUM CHLORIDE 600; 310; 30; 20 MG/100ML; MG/100ML; MG/100ML; MG/100ML
9 INJECTION, SOLUTION INTRAVENOUS CONTINUOUS
Status: DISCONTINUED | OUTPATIENT
Start: 2024-08-08 | End: 2024-08-08

## 2024-08-08 RX ORDER — OXYCODONE HYDROCHLORIDE 5 MG/1
5 TABLET ORAL EVERY 8 HOURS PRN
Qty: 12 TABLET | Refills: 0 | Status: SHIPPED | OUTPATIENT
Start: 2024-08-08 | End: 2025-08-08

## 2024-08-08 RX ORDER — PROMETHAZINE HYDROCHLORIDE 25 MG/1
25 TABLET ORAL ONCE AS NEEDED
Status: DISCONTINUED | OUTPATIENT
Start: 2024-08-08 | End: 2024-08-08 | Stop reason: HOSPADM

## 2024-08-08 RX ORDER — HYDROMORPHONE HYDROCHLORIDE 1 MG/ML
0.5 INJECTION, SOLUTION INTRAMUSCULAR; INTRAVENOUS; SUBCUTANEOUS EVERY 4 HOURS PRN
Status: DISCONTINUED | OUTPATIENT
Start: 2024-08-08 | End: 2024-08-09 | Stop reason: HOSPADM

## 2024-08-08 RX ORDER — FENTANYL CITRATE 50 UG/ML
INJECTION, SOLUTION INTRAMUSCULAR; INTRAVENOUS AS NEEDED
Status: DISCONTINUED | OUTPATIENT
Start: 2024-08-08 | End: 2024-08-08 | Stop reason: SURG

## 2024-08-08 RX ORDER — NALOXONE HCL 0.4 MG/ML
0.2 VIAL (ML) INJECTION AS NEEDED
Status: DISCONTINUED | OUTPATIENT
Start: 2024-08-08 | End: 2024-08-08

## 2024-08-08 RX ORDER — KETOROLAC TROMETHAMINE 15 MG/ML
15 INJECTION, SOLUTION INTRAMUSCULAR; INTRAVENOUS EVERY 6 HOURS PRN
Status: DISCONTINUED | OUTPATIENT
Start: 2024-08-08 | End: 2024-08-09 | Stop reason: HOSPADM

## 2024-08-08 RX ORDER — SODIUM CHLORIDE 0.9 % (FLUSH) 0.9 %
10 SYRINGE (ML) INJECTION AS NEEDED
Status: DISCONTINUED | OUTPATIENT
Start: 2024-08-08 | End: 2024-08-08 | Stop reason: HOSPADM

## 2024-08-08 RX ORDER — ONDANSETRON 2 MG/ML
4 INJECTION INTRAMUSCULAR; INTRAVENOUS EVERY 6 HOURS PRN
Status: DISCONTINUED | OUTPATIENT
Start: 2024-08-08 | End: 2024-08-09 | Stop reason: HOSPADM

## 2024-08-08 RX ORDER — SODIUM CHLORIDE 9 MG/ML
40 INJECTION, SOLUTION INTRAVENOUS AS NEEDED
Status: DISCONTINUED | OUTPATIENT
Start: 2024-08-08 | End: 2024-08-09 | Stop reason: HOSPADM

## 2024-08-08 RX ORDER — AMLODIPINE BESYLATE 5 MG/1
5 TABLET ORAL
Status: DISCONTINUED | OUTPATIENT
Start: 2024-08-09 | End: 2024-08-09 | Stop reason: HOSPADM

## 2024-08-08 RX ORDER — POTASSIUM CHLORIDE 750 MG/1
40 TABLET, FILM COATED, EXTENDED RELEASE ORAL EVERY 4 HOURS
Status: COMPLETED | OUTPATIENT
Start: 2024-08-08 | End: 2024-08-09

## 2024-08-08 RX ORDER — FAMOTIDINE 20 MG/1
40 TABLET, FILM COATED ORAL DAILY
Status: DISCONTINUED | OUTPATIENT
Start: 2024-08-08 | End: 2024-08-09 | Stop reason: HOSPADM

## 2024-08-08 RX ORDER — ACETAMINOPHEN 500 MG
1000 TABLET ORAL EVERY 6 HOURS
Status: DISCONTINUED | OUTPATIENT
Start: 2024-08-08 | End: 2024-08-09 | Stop reason: HOSPADM

## 2024-08-08 RX ORDER — OXYCODONE HYDROCHLORIDE 5 MG/1
5 TABLET ORAL EVERY 4 HOURS PRN
Status: DISCONTINUED | OUTPATIENT
Start: 2024-08-08 | End: 2024-08-09 | Stop reason: HOSPADM

## 2024-08-08 RX ORDER — SODIUM CHLORIDE 0.9 % (FLUSH) 0.9 %
10 SYRINGE (ML) INJECTION EVERY 12 HOURS SCHEDULED
Status: DISCONTINUED | OUTPATIENT
Start: 2024-08-08 | End: 2024-08-08 | Stop reason: HOSPADM

## 2024-08-08 RX ORDER — FLUMAZENIL 0.1 MG/ML
0.2 INJECTION INTRAVENOUS AS NEEDED
Status: DISCONTINUED | OUTPATIENT
Start: 2024-08-08 | End: 2024-08-08 | Stop reason: HOSPADM

## 2024-08-08 RX ORDER — LEVOTHYROXINE SODIUM 0.07 MG/1
75 TABLET ORAL
Status: DISCONTINUED | OUTPATIENT
Start: 2024-08-09 | End: 2024-08-09 | Stop reason: HOSPADM

## 2024-08-08 RX ORDER — ACETAMINOPHEN 500 MG
1000 TABLET ORAL ONCE
Status: COMPLETED | OUTPATIENT
Start: 2024-08-08 | End: 2024-08-08

## 2024-08-08 RX ORDER — PROPOFOL 10 MG/ML
VIAL (ML) INTRAVENOUS AS NEEDED
Status: DISCONTINUED | OUTPATIENT
Start: 2024-08-08 | End: 2024-08-08 | Stop reason: SURG

## 2024-08-08 RX ORDER — SODIUM CHLORIDE 9 MG/ML
40 INJECTION, SOLUTION INTRAVENOUS AS NEEDED
Status: DISCONTINUED | OUTPATIENT
Start: 2024-08-08 | End: 2024-08-08 | Stop reason: HOSPADM

## 2024-08-08 RX ORDER — SCOLOPAMINE TRANSDERMAL SYSTEM 1 MG/1
1 PATCH, EXTENDED RELEASE TRANSDERMAL CONTINUOUS
Status: DISCONTINUED | OUTPATIENT
Start: 2024-08-08 | End: 2024-08-09 | Stop reason: HOSPADM

## 2024-08-08 RX ORDER — FAMOTIDINE 10 MG/ML
20 INJECTION, SOLUTION INTRAVENOUS ONCE
Status: COMPLETED | OUTPATIENT
Start: 2024-08-08 | End: 2024-08-08

## 2024-08-08 RX ORDER — HYDRALAZINE HYDROCHLORIDE 20 MG/ML
5 INJECTION INTRAMUSCULAR; INTRAVENOUS
Status: DISCONTINUED | OUTPATIENT
Start: 2024-08-08 | End: 2024-08-08 | Stop reason: HOSPADM

## 2024-08-08 RX ORDER — EPHEDRINE SULFATE 50 MG/ML
5 INJECTION, SOLUTION INTRAVENOUS ONCE AS NEEDED
Status: DISCONTINUED | OUTPATIENT
Start: 2024-08-08 | End: 2024-08-08 | Stop reason: HOSPADM

## 2024-08-08 RX ORDER — HYDROCODONE BITARTRATE AND ACETAMINOPHEN 5; 325 MG/1; MG/1
1 TABLET ORAL ONCE AS NEEDED
Status: DISCONTINUED | OUTPATIENT
Start: 2024-08-08 | End: 2024-08-08 | Stop reason: HOSPADM

## 2024-08-08 RX ADMIN — FENTANYL CITRATE 25 MCG: 50 INJECTION, SOLUTION INTRAMUSCULAR; INTRAVENOUS at 14:18

## 2024-08-08 RX ADMIN — FENTANYL CITRATE 25 MCG: 50 INJECTION, SOLUTION INTRAMUSCULAR; INTRAVENOUS at 11:52

## 2024-08-08 RX ADMIN — KETOROLAC TROMETHAMINE 15 MG: 15 INJECTION, SOLUTION INTRAMUSCULAR; INTRAVENOUS at 18:44

## 2024-08-08 RX ADMIN — HYDROMORPHONE HYDROCHLORIDE 0.25 MG: 1 INJECTION, SOLUTION INTRAMUSCULAR; INTRAVENOUS; SUBCUTANEOUS at 16:34

## 2024-08-08 RX ADMIN — PROPOFOL 100 MG: 10 INJECTION, EMULSION INTRAVENOUS at 10:59

## 2024-08-08 RX ADMIN — ROCURONIUM BROMIDE 10 MG: 10 INJECTION, SOLUTION INTRAVENOUS at 12:42

## 2024-08-08 RX ADMIN — FENTANYL CITRATE 25 MCG: 50 INJECTION, SOLUTION INTRAMUSCULAR; INTRAVENOUS at 16:47

## 2024-08-08 RX ADMIN — PROPOFOL 100 MCG/KG/MIN: 10 INJECTION, EMULSION INTRAVENOUS at 11:00

## 2024-08-08 RX ADMIN — PROPOFOL 70 MG: 10 INJECTION, EMULSION INTRAVENOUS at 14:18

## 2024-08-08 RX ADMIN — SODIUM CHLORIDE, POTASSIUM CHLORIDE, SODIUM LACTATE AND CALCIUM CHLORIDE: 600; 310; 30; 20 INJECTION, SOLUTION INTRAVENOUS at 10:49

## 2024-08-08 RX ADMIN — PROPOFOL 50 MG: 10 INJECTION, EMULSION INTRAVENOUS at 13:56

## 2024-08-08 RX ADMIN — LIDOCAINE HYDROCHLORIDE 60 MG: 20 INJECTION, SOLUTION INFILTRATION; PERINEURAL at 10:59

## 2024-08-08 RX ADMIN — FENTANYL CITRATE 25 MCG: 50 INJECTION, SOLUTION INTRAMUSCULAR; INTRAVENOUS at 16:07

## 2024-08-08 RX ADMIN — GLYCOPYRROLATE 0.1 MG: 0.2 INJECTION INTRAMUSCULAR; INTRAVENOUS at 11:09

## 2024-08-08 RX ADMIN — SUGAMMADEX 200 MG: 100 INJECTION, SOLUTION INTRAVENOUS at 15:08

## 2024-08-08 RX ADMIN — FENTANYL CITRATE 25 MCG: 50 INJECTION, SOLUTION INTRAMUSCULAR; INTRAVENOUS at 15:45

## 2024-08-08 RX ADMIN — FENTANYL CITRATE 25 MCG: 50 INJECTION, SOLUTION INTRAMUSCULAR; INTRAVENOUS at 13:57

## 2024-08-08 RX ADMIN — ROCURONIUM BROMIDE 10 MG: 10 INJECTION, SOLUTION INTRAVENOUS at 11:31

## 2024-08-08 RX ADMIN — POTASSIUM CHLORIDE 40 MEQ: 750 TABLET, EXTENDED RELEASE ORAL at 21:21

## 2024-08-08 RX ADMIN — FAMOTIDINE 20 MG: 10 INJECTION INTRAVENOUS at 09:35

## 2024-08-08 RX ADMIN — FENTANYL CITRATE 25 MCG: 50 INJECTION, SOLUTION INTRAMUSCULAR; INTRAVENOUS at 15:08

## 2024-08-08 RX ADMIN — FENTANYL CITRATE 25 MCG: 50 INJECTION, SOLUTION INTRAMUSCULAR; INTRAVENOUS at 10:59

## 2024-08-08 RX ADMIN — ACETAMINOPHEN 1000 MG: 500 TABLET ORAL at 21:22

## 2024-08-08 RX ADMIN — HYDROMORPHONE HYDROCHLORIDE 0.25 MG: 1 INJECTION, SOLUTION INTRAMUSCULAR; INTRAVENOUS; SUBCUTANEOUS at 16:26

## 2024-08-08 RX ADMIN — ONDANSETRON 4 MG: 2 INJECTION INTRAMUSCULAR; INTRAVENOUS at 15:08

## 2024-08-08 RX ADMIN — SODIUM CHLORIDE, POTASSIUM CHLORIDE, SODIUM LACTATE AND CALCIUM CHLORIDE: 600; 310; 30; 20 INJECTION, SOLUTION INTRAVENOUS at 13:08

## 2024-08-08 RX ADMIN — DEXAMETHASONE SODIUM PHOSPHATE 4 MG: 4 INJECTION, SOLUTION INTRA-ARTICULAR; INTRALESIONAL; INTRAMUSCULAR; INTRAVENOUS; SOFT TISSUE at 11:09

## 2024-08-08 RX ADMIN — FENTANYL CITRATE 25 MCG: 50 INJECTION, SOLUTION INTRAMUSCULAR; INTRAVENOUS at 15:39

## 2024-08-08 RX ADMIN — ROCURONIUM BROMIDE 40 MG: 10 INJECTION, SOLUTION INTRAVENOUS at 10:59

## 2024-08-08 RX ADMIN — FENTANYL CITRATE 25 MCG: 50 INJECTION, SOLUTION INTRAMUSCULAR; INTRAVENOUS at 12:58

## 2024-08-08 RX ADMIN — ROCURONIUM BROMIDE 10 MG: 10 INJECTION, SOLUTION INTRAVENOUS at 13:56

## 2024-08-08 RX ADMIN — HYDROMORPHONE HYDROCHLORIDE 0.25 MG: 1 INJECTION, SOLUTION INTRAMUSCULAR; INTRAVENOUS; SUBCUTANEOUS at 18:27

## 2024-08-08 RX ADMIN — CEFOXITIN SODIUM 2000 MG: 2 POWDER, FOR SOLUTION INTRAVENOUS at 10:42

## 2024-08-08 RX ADMIN — HYDROMORPHONE HYDROCHLORIDE 0.25 MG: 1 INJECTION, SOLUTION INTRAMUSCULAR; INTRAVENOUS; SUBCUTANEOUS at 18:35

## 2024-08-08 RX ADMIN — SODIUM CHLORIDE, POTASSIUM CHLORIDE, SODIUM LACTATE AND CALCIUM CHLORIDE 9 ML/HR: 600; 310; 30; 20 INJECTION, SOLUTION INTRAVENOUS at 09:34

## 2024-08-08 RX ADMIN — HYDROCODONE BITARTRATE AND ACETAMINOPHEN 1 TABLET: 7.5; 325 TABLET ORAL at 16:26

## 2024-08-08 RX ADMIN — FENTANYL CITRATE 25 MCG: 50 INJECTION, SOLUTION INTRAMUSCULAR; INTRAVENOUS at 15:00

## 2024-08-08 RX ADMIN — FENTANYL CITRATE 25 MCG: 50 INJECTION, SOLUTION INTRAMUSCULAR; INTRAVENOUS at 16:55

## 2024-08-08 RX ADMIN — ONDANSETRON 4 MG: 2 INJECTION, SOLUTION INTRAMUSCULAR; INTRAVENOUS at 15:40

## 2024-08-08 RX ADMIN — ROCURONIUM BROMIDE 10 MG: 10 INJECTION, SOLUTION INTRAVENOUS at 11:52

## 2024-08-08 RX ADMIN — Medication 10 ML: at 21:30

## 2024-08-08 RX ADMIN — ACETAMINOPHEN 1000 MG: 500 TABLET ORAL at 09:13

## 2024-08-08 RX ADMIN — ROCURONIUM BROMIDE 10 MG: 10 INJECTION, SOLUTION INTRAVENOUS at 11:56

## 2024-08-08 RX ADMIN — FENTANYL CITRATE 25 MCG: 50 INJECTION, SOLUTION INTRAMUSCULAR; INTRAVENOUS at 16:00

## 2024-08-08 RX ADMIN — SCOPALAMINE 1 PATCH: 1 PATCH, EXTENDED RELEASE TRANSDERMAL at 09:13

## 2024-08-08 RX ADMIN — CEFOXITIN SODIUM 2 G: 2 POWDER, FOR SOLUTION INTRAVENOUS at 12:42

## 2024-08-08 RX ADMIN — FAMOTIDINE 40 MG: 20 TABLET, FILM COATED ORAL at 21:21

## 2024-08-08 RX ADMIN — CEFOXITIN SODIUM 2 G: 2 POWDER, FOR SOLUTION INTRAVENOUS at 14:42

## 2024-08-08 RX ADMIN — ROCURONIUM BROMIDE 10 MG: 10 INJECTION, SOLUTION INTRAVENOUS at 14:23

## 2024-08-08 RX ADMIN — FENTANYL CITRATE 25 MCG: 50 INJECTION, SOLUTION INTRAMUSCULAR; INTRAVENOUS at 11:31

## 2024-08-08 NOTE — ANESTHESIA PROCEDURE NOTES
Airway  Urgency: elective    Date/Time: 8/8/2024 11:02 AM  Airway not difficult    General Information and Staff    Patient location during procedure: OR    Indications and Patient Condition  Indications for airway management: airway protection    Preoxygenated: yes  Mask difficulty assessment: 2 - vent by mask + OA or adjuvant +/- NMBA    Final Airway Details  Final airway type: endotracheal airway      Successful airway: ETT  Cuffed: yes   Successful intubation technique: direct laryngoscopy  Facilitating devices/methods: intubating stylet  Endotracheal tube insertion site: oral  Blade: Shree  Blade size: 3  ETT size (mm): 7.0  Cormack-Lehane Classification: grade I - full view of glottis  Placement verified by: chest auscultation and capnometry   Measured from: lips  ETT/EBT  to lips (cm): 21  Number of attempts at approach: 1  Assessment: lips, teeth, and gum same as pre-op and atraumatic intubation

## 2024-08-08 NOTE — CONSULTS
Chp met with Pt to go over an Advance Directive. Chp facilitated AD with Pt and answered Pt questions. Pt named daughters as their health care surrogates. The form was completed and notarized by Kentrell SAUCEDO. Pt got original copy, copies given to HCS, copy was placed in chart, and a copy was faxed to HIM.

## 2024-08-08 NOTE — ANESTHESIA POSTPROCEDURE EVALUATION
Patient: Jerilyn Villela    Procedure Summary       Date: 08/08/24 Room / Location: [facility] OR  / [facility] MAIN OR    Anesthesia Start: 1049 Anesthesia Stop: 1530    Procedures:       TOTAL VAGINAL HYSTERECTOMY BILATERAL SALPING-OOPHORECTOMY ANTERIOR/POSTERIOR COLPORRHAPHY,  CYSTOSCTOPY  SLING (Uterus)      BILATERAL UTEROSACRAL LIGAMENT SUSPENSION (Vagina) Diagnosis:     Surgeons: Walter Fuentes MD Provider: Andrea Plasencia MD    Anesthesia Type: general ASA Status: 3            Anesthesia Type: general    Vitals  Vitals Value Taken Time   /76 08/08/24 1700   Temp 36.7 °C (98.1 °F) 08/08/24 1525   Pulse 70 08/08/24 1700   Resp     SpO2 90 % 08/08/24 1700   Vitals shown include unfiled device data.        Anesthesia Post Evaluation

## 2024-08-08 NOTE — PROGRESS NOTES
Contacted by PACU nursing due to persistent postoperative pain. The patient has received a total of 150 mcg of fentanyl and 0.75 mg dilaudid per RN with continued 8/10 pain, described as pain in vagina and near suture sites. Vital signs reviewed with RN HR 74 bpm, BP 130s/70s consistently, not requiring supplemental oxygen. The patient voided approximately 150 ml and had pvr of 900 ml and had catheter placed. Discussed that the pressure from her bladder could have exacerbated discomfort. Patient and family do not feel comfortable going home tonight due to pain control. Ordered stat CBC and BMP, order for toradol now and scheduled thereafter. Will repeat voiding trial in the morning once pain control has improved. Discussed with attending Dr. Walter Fuentes.     Krystyna Nesbitt MD, PGY-6

## 2024-08-08 NOTE — OP NOTE
OPERATIVE REPORT     Surgery Date:  8/8/2024    Preoperative Diagnosis:   Stage 3 uterovaginal prolapse   Stress urinary incontinence    Postoperative Diagnosis:   Stage 3 uterovaginal prolapse   Stress urinary incontinence    Surgery:   Total vaginal hysterectomy  Bilateral salpingo-oophorectomy   High uterosacral ligament suspension   Anterior colporrhaphy  Posterior colporrhaphy   Synthetic retropubic midurethral sling (Advantage Fit)   Cystoscopy (multiple)    Attending:  Dr. Walter Fuentes MD  Fellow:   MD Dr. Indu Plasencia MD    Anesthesia: General endotracheal     Antibiotics: Ancef 2g     EBL: 200 mL    Urine output:  1000 mL    Complications:  none    Implants: Advantage Fit retropubic midurethral sling     Specimens:    Uterus   Cervix  Bilateral fallopian tubes   Bilateral ovaries     Intraoperative Findings:    Anterior and apical predominant stage 3 uterovaginal prolapse   Multiparous cervix, normal bilateral fallopian tubes and ovaries   Pedicle sites hemostatic upon completion of hysterectomy   [place] noted to be -10 cm following uterosacral ligament suspension   No evidence of bladder mucosa injury on multiple cystoscopies, initially on cystoscopy no efflux from left ureter. A pollock catheter was attempted to place through the left UO and resistance was met. The left cuff corner suture was cut and brisk efflux was noted from the left UO immediately.   No evidence of bladder mucosa injury following sling   All incision sites hemostatic upon completion      Description of Procedure:      Consents were reviewed in the preoperative area and the preoperative antibiotics were given after the risks associated with the procedure were discussed with the patient (which include bleeding, infection, failure of the procedure and need for repeat surgery in the future, as well as injury to surrounding tissue had been discussed and consents confirmed. The patient was taken to the operating room,  where sequential compression boots were placed and pumping prior to the induction of anesthesia. The patient was placed under general endotracheal anesthesia and placed in dorsal lithotomy position in [place] stirrups. A time out was performed and the patient was prepped and draped in a sterile fashion. A Yuniel hugger was placed to maintain core body temperature. A Dooley catheter was inserted into the bladder.      The vaginal [place] was then set up in the usual fashion to improve exposure. The lateral vaginal retractors were placed on either side of the vagina close to the fourchette to provide retraction. A Jai tenaculum was placed on the anterior lip of the cervix and another on the posterior lip of the cervix. The vaginal epithelium at the cervicovaginal junction was infiltrated with a diluted solution of epinephrine (0.625 mg in 500 mL of normal saline). A circumferential incision was made at the cervicovaginal junction with the scalpel. The bladder was then dissected away from the cervix using the Metzenbaum scissors. An anterior blade was then placed to lift the bladder away. The bladder pillars were then taken down using the Bovie electrocautery. The vaginal epithelium was pushed further cephalad using blunt dissection.      Attention was then turned to the posterior vagina, where posterior colpotomy was then completed by grabbing the peritoneum with pickups and incising the peritoneum with the Dee scissors. The scissors was opened and the posterior blade was placed under the scissors into the posterior cul-de-sac and withdrawing the scissor as the blade was been placed. The uterosacral ligaments were grasped with a Abi clamp, transected, and suture ligated using #0 Vicryl suture. The cardinal ligaments were then clamped and cut and suture ligated using #0 Vicryl suture. Attention was then turned to the anterior vagina, where the bladder was further dissected away from the cervix until the  vesicouterine fold of the peritoneum was identified. The fold was then picked up using the [place] and the anterior cul-de-sac was entered. The anterior retractor was then placed. The uterine arteries were then identified, clamped with a Abi clamp, cut and suture ligated using #0 Vicryl suture. The Enseal device was then used to sequentially move up the cardinal ligaments bilaterally. This was done until the utero-ovarian ligaments were isolated. The utero-ovarian ligaments were clamped with Abi clamps, cut, and suture ligated using #0 Vicryl suture. The uterus and cervix were sent to Pathology.      At this point, we began to survey the ovaries. The ovaries were visualized and was felt to be able to be removed safely. The right tube and ovary were then grasped with a Sara clamp and dissected away from the pelvic sidewall so that the infundibulopelvic ligament was isolated. Using a right angle clamp, the mesosalpinx was identified and the clamp passed behind the ovary to expose the mesosalpinx. The mesosalpinx was incised using the Bovie. The right infundibulopelvic ligament was then clamped, cut, and suture ligated using a 2-0 PDS on an Endoloop. The procedure was completed on the opposite side. The specimens were handed off to and sent to pathology. Excellent hemostasis was noted at the pedicles.    The bowel was then packed into the upper abdomen with a moist laparotomy sponge in order to provide visualization to perform the uterosacral suspension portion of the procedure.  A right angle retractor was placed on the posterior into the abdominal cavity to retract the bowel medially and flatten the peritoneum over the pelvic side wall.  A Chantale was used for retraction anteriorily.  On the right, the first stitch was passed through the posterior vaginal cuff incorporating the peritoneum, then passing the suture through the ipsilateral uterosacral ligament, going as close/high as possible to the sacrum, then  back through the posterior vaginal cuff, using #1 Vicryl suture.  This was repeat two more times on the right with each suture being placed approximately 1 cm distal on the uterosacral ligament.  Each suture was tagged with different clamps.  The same procedure was repeated on the left side. Finally, the corners were created. On the right, using a #0 Vicryl suture, the suture was passed through through the posterior vaginal cuff at the corner incorporating the peritoneum, passing through the distal portion of both the ipsilateral uterosacral ligament and infundibulopelvic ligament, and through the anterior vaginal vaginal cuff. A second suture was passed from the posterior to the anterior vaginal cuff. The same procedure was repeated on the left.  The laparotomy sponge was removed.    Attention was turned to the anterior vaginal wall to perform the anterior colporrhaphy.  The anterior vaginal mucosa was hydrodisected with a diluted solution of epinephrine (0.625 mg in 500 mL of normal saline).  An Allis clamp was approximately 1.5 cm below the posterior margin of the urethral meatus.  Two additional Allis clamps were placed in the anterior vaginal cuff equidistance from the midline.  A midline vertical incision was made with the Dee scissors from the vaginal apex extending up to the distal Allis clamp.  The edges of the incision were grasped and retracted using Allis clamps.  The vaginal epithelium was then dissected off the underlying vaginal muscularis using Dee scissors unto the entire extent of the anterior vaginal prolapse was dissected down to the underlying layers of connective tissue.  Hemostasis was maintained using the Bovie electrocautery.  The anterior vaginal prolapse was then repaired in an interrupted fashion using 2-0 Vicryl suture.  The vaginal muscularis and adventitia on either side of the prolapse was plicated to the midline.  The vaginal epithelium was trimmed and then re-approximated in a  interrupted fashion using #0 Vicryl suture.  The vaginal apex was closed in an interrupted fashion using #0 Vicryl suture.    The Prajapati catheter was removed. A cystoscopy was performed at this point that demonstrated no efflux from the left ureter. The 70 degree scope was removed and a 30-degree cystoscope was used to place a pollock ureteral catheter on the left side. The catheter was attempted to be passed, however resistance was met. The left 0-Vicryl corner suture was removed and immediate efflux was noted from the left ureter. The #1 Vicryl sutures that were used for the uterosacral suspension were tied down approximating the vaginal cuff towards the uterosacral ligament. A cystoscopy was performed after tying down the each set of sutures with efflux of urine seen from both ureters.    The prajapati catheter was replaced in the bladder. The suprapubic exit points were marked 4 cm distance at the pubic bone. The retropubic space was infiltrated with 60 mL of dilute epinephrine in normal saline.   Two Allis' were placed at 1.5 cm distal to the urethral meatus and at the urethro-vesicular junction framing the mid-urethra. Both lateral sulci were injected with 60 ml of the epinephrine solution and a midline incision was made in the vaginal mucosa over the mid-urethra with a #15 blade scalpel. Bilateral tunnels were made under the mucosa toward the pubic rami until the pubic bone was reached.  The Advantage Fit retropubic sling was used and introduced into the right and left tunnel and through the retropubic place. The prajapati catheter was removed and cystoscopy was performed that showed no evidence of bladder injury with the trocar. The cystoscope was removed. The blue tab was cut and the sling was tensioned with a Pau clamp at the urethra.The mesh over the urethra was seen to be laying flat and loose enough not to exert pressure on urethra at rest. The vaginal incision was copiously irrigated with sterile fluid and  wound was found to be hemostatic.A deep horizontal mattress suture was placed overlying the vaginal epithelium over the mesh. The incision was closed with 2-0 Vicryl.       Attention was turned to perform the posterior colporrhaphy. Two Allis clamps were placed on the vaginal introitus at 5 and 7 o'clock positions. The mucosa was injected with diluted epinephrine solution (0.625 mg in 500 mL of normal saline). An incision was made at the vaginal introitus medial to the clamp and extending down the perineum to create an inverted triangle. An inverted triangular incision was excised from the posterior perineum and extend cephalad on the posterior vagina close to the defect that was created earlier. The fascia over the levator muscles were dissected down and reapproximated in the midline using #0 Vicryl sutures taking care not to reapproximate the bulk of the posterior levator muscles. The vaginal mucosa was closed using #0 Vicryl suture in a running locking fashion. A crown stitch reapproximated the distal levator muscles using #0 Vicryl suture. The perineal body was then recreated by reapproximating the superficial transverse perineal, bulbocavernosus, and raphe of the external anal sphincter using a #1 Vicryl suture. The perineal skin was closed with 3-0 Vicryl in a running subcuticular fashion. Excellent hemostasis was noted at the end of the procedure.    All sponge, needle, and instrument counts were noted to be correct x2 at the end of the procedure. The patient tolerated the procedure well.  She was extubated and transferred to the recovery room in stable condition. Dr. Walter Fuentes was present and participated in all portions of the procedure from opening to closing.    Krystyna Nesbitt MD   Attending: Dr. Walter Fuentes MD

## 2024-08-09 VITALS
BODY MASS INDEX: 24.54 KG/M2 | HEART RATE: 60 BPM | RESPIRATION RATE: 16 BRPM | OXYGEN SATURATION: 97 % | HEIGHT: 64 IN | TEMPERATURE: 97.9 F | SYSTOLIC BLOOD PRESSURE: 117 MMHG | WEIGHT: 143.74 LBS | DIASTOLIC BLOOD PRESSURE: 70 MMHG

## 2024-08-09 LAB
ANION GAP SERPL CALCULATED.3IONS-SCNC: 7.9 MMOL/L (ref 5–15)
BUN SERPL-MCNC: 7 MG/DL (ref 8–23)
BUN/CREAT SERPL: 9.9 (ref 7–25)
CALCIUM SPEC-SCNC: 9.2 MG/DL (ref 8.6–10.5)
CHLORIDE SERPL-SCNC: 101 MMOL/L (ref 98–107)
CO2 SERPL-SCNC: 23.1 MMOL/L (ref 22–29)
CREAT SERPL-MCNC: 0.71 MG/DL (ref 0.57–1)
EGFRCR SERPLBLD CKD-EPI 2021: 87.7 ML/MIN/1.73
GLUCOSE SERPL-MCNC: 118 MG/DL (ref 65–99)
POTASSIUM SERPL-SCNC: 4.5 MMOL/L (ref 3.5–5.2)
POTASSIUM SERPL-SCNC: 4.5 MMOL/L (ref 3.5–5.2)
SODIUM SERPL-SCNC: 132 MMOL/L (ref 136–145)

## 2024-08-09 PROCEDURE — 63710000001 POTASSIUM CHLORIDE 10 MEQ TABLET CONTROLLED-RELEASE: Performed by: OBSTETRICS & GYNECOLOGY

## 2024-08-09 PROCEDURE — A9270 NON-COVERED ITEM OR SERVICE: HCPCS | Performed by: STUDENT IN AN ORGANIZED HEALTH CARE EDUCATION/TRAINING PROGRAM

## 2024-08-09 PROCEDURE — 63710000001 LEVOTHYROXINE 75 MCG TABLET: Performed by: STUDENT IN AN ORGANIZED HEALTH CARE EDUCATION/TRAINING PROGRAM

## 2024-08-09 PROCEDURE — 84132 ASSAY OF SERUM POTASSIUM: CPT | Performed by: OBSTETRICS & GYNECOLOGY

## 2024-08-09 PROCEDURE — 63710000001 ACETAMINOPHEN EXTRA STRENGTH 500 MG TABLET: Performed by: STUDENT IN AN ORGANIZED HEALTH CARE EDUCATION/TRAINING PROGRAM

## 2024-08-09 PROCEDURE — G0378 HOSPITAL OBSERVATION PER HR: HCPCS

## 2024-08-09 PROCEDURE — 63710000001 AMLODIPINE 5 MG TABLET: Performed by: STUDENT IN AN ORGANIZED HEALTH CARE EDUCATION/TRAINING PROGRAM

## 2024-08-09 PROCEDURE — 80048 BASIC METABOLIC PNL TOTAL CA: CPT | Performed by: STUDENT IN AN ORGANIZED HEALTH CARE EDUCATION/TRAINING PROGRAM

## 2024-08-09 PROCEDURE — A9270 NON-COVERED ITEM OR SERVICE: HCPCS | Performed by: OBSTETRICS & GYNECOLOGY

## 2024-08-09 PROCEDURE — 63710000001 FAMOTIDINE 20 MG TABLET: Performed by: STUDENT IN AN ORGANIZED HEALTH CARE EDUCATION/TRAINING PROGRAM

## 2024-08-09 PROCEDURE — 63710000001 OXYCODONE 5 MG TABLET: Performed by: STUDENT IN AN ORGANIZED HEALTH CARE EDUCATION/TRAINING PROGRAM

## 2024-08-09 RX ADMIN — OXYCODONE HYDROCHLORIDE 5 MG: 5 TABLET ORAL at 01:37

## 2024-08-09 RX ADMIN — ACETAMINOPHEN 1000 MG: 500 TABLET ORAL at 09:14

## 2024-08-09 RX ADMIN — AMLODIPINE BESYLATE 5 MG: 5 TABLET ORAL at 09:14

## 2024-08-09 RX ADMIN — LEVOTHYROXINE SODIUM 75 MCG: 75 TABLET ORAL at 05:57

## 2024-08-09 RX ADMIN — OXYCODONE HYDROCHLORIDE 5 MG: 5 TABLET ORAL at 14:14

## 2024-08-09 RX ADMIN — ACETAMINOPHEN 1000 MG: 500 TABLET ORAL at 02:03

## 2024-08-09 RX ADMIN — POTASSIUM CHLORIDE 40 MEQ: 750 TABLET, EXTENDED RELEASE ORAL at 01:37

## 2024-08-09 RX ADMIN — FAMOTIDINE 40 MG: 20 TABLET, FILM COATED ORAL at 09:14

## 2024-08-09 RX ADMIN — Medication 10 ML: at 09:19

## 2024-08-09 RX ADMIN — OXYCODONE HYDROCHLORIDE 5 MG: 5 TABLET ORAL at 05:57

## 2024-08-09 NOTE — PROGRESS NOTES
Case Management Discharge Note      Final Note: Discharged home. Jerilyn Moulton RN         Selected Continued Care - Discharged on 8/9/2024 Admission date: 8/8/2024 - Discharge disposition: Home or Self Care       Transportation Services  Private: Car    Final Discharge Disposition Code: 01 - home or self-care

## 2024-08-09 NOTE — DISCHARGE SUMMARY
[facility]   Jerilyn Villela  : 1945  MRN: [id number]  CSN: [id number]    Discharge Summary      Date of Admission: 2024   Date of Discharge: 2024   Discharge Diagnoses: Acute postoperative pain, resolved  Urinary retention (post-op)   Procedures Performed: Procedure(s):  TOTAL VAGINAL HYSTERECTOMY BILATERAL SALPING-OOPHORECTOMY ANTERIOR/POSTERIOR COLPORRHAPHY,  CYSTOSCTOPY  SLING  BILATERAL UTEROSACRAL LIGAMENT SUSPENSION      Consults: None   Brief History: Patient is a 79 y.o. with stage 3 uterovaginal prolapse (apical predominant) and JAY who presented for surgical management with TVH, BSO, USLS, A/P repair, retropubic midurethral sling, cystoscopy on 24.   Hospital Course: Postoperatively, the patient had unresolved pain despite fentanyl, dilaudid, and Norco. She was admitted for observation postoperatively and started on toradol, scheduled tylenol, and PRN oxycodone. She has done well overnight and states her pain resolved. Unable to pass voiding trial postoperatively in PACU with  ml. Prajapati catheter placed. Patient failed repeat voiding trial this morning, thus discharged home with prajapati catheter. CBC wnl. BMP with potassium 3.3. Improved after repletion to 4.5 this morning.     Physical Exam:     General appearance - alert, well appearing, and in no distress    Mental Status - alert, oriented to person, place, and time    Chest - clear to auscultation, no wheezes, rales or rhonchi, symmetric air entry     Heart - Acyanotic     Abdomen - soft, nondistended, nontender     Pending Studies: Pending tests: none   Condition at discharge: stable   Discharge Medications:    Your medication list        START taking these medications        Instructions Last Dose Given Next Dose Due   oxyCODONE 5 MG immediate release tablet  Commonly known as: Roxicodone      Take 1 tablet by mouth Every 8 (Eight) Hours As Needed for Severe Pain.              CONTINUE taking these medications         Instructions Last Dose Given Next Dose Due   amLODIPine 5 MG tablet  Commonly known as: NORVASC      Take 1 tablet by mouth 2 (Two) Times a Day. 1 TAB DAILY IN AM AND 1/2 TAB IN PM IF BP >135/85       aspirin 81 MG chewable tablet      Chew 1 tablet Daily. CONTACT MD REGARDING HOLDING PRIOR TO SURGERY       Biotin 64723 MCG tablet      Take 1 tablet by mouth Daily. PT HOLDING FOR SURGERY       cholecalciferol 10 MCG (400 UNIT) tablet  Commonly known as: VITAMIN D3      Take 1 tablet by mouth Daily.       estradiol 0.1 MG/GM vaginal cream  Commonly known as: ESTRACE      Insert 0.5 g into the vagina 2 (Two) Times a Week. WED SAT       guaiFENesin 600 MG 12 hr tablet  Commonly known as: MUCINEX      Take 2 tablets by mouth Daily As Needed for Cough.       hydroCHLOROthiazide 12.5 MG tablet      Take 1 tablet by mouth Daily.       ketoconazole 2 % shampoo  Commonly known as: NIZORAL      Apply 1 Application topically to the appropriate area as directed 3 (Three) Times a Week.       multivitamin tablet tablet      Take 1 tablet by mouth Daily. PT HOLDING FOR SURGERY       POTASSIUM CHLORIDE PO      Take 10 mEq by mouth Daily.       Synthroid 75 MCG tablet  Generic drug: levothyroxine      Take 1 tablet by mouth Every Morning.       Triamcinolone Acetonide 55 MCG/ACT nasal inhaler  Commonly known as: NASACORT      2 sprays into the nostril(s) as directed by provider Daily As Needed.       vitamin C 250 MG tablet  Commonly known as: ASCORBIC ACID      Take 1 tablet by mouth Daily. PT HOLDING FOR SURGERY       ZINC OXIDE PO      Take 1 tablet by mouth Daily. PT HOLDING FOR SURGERY                 Where to Get Your Medications        These medications were sent to [facility] [place] [id number]      Hours: Monday to Friday 7 AM to 6 PM, Saturday & Sunday 8 AM to 4:30 PM (Closed 12 PM to 12:30 PM) Phone: [phone]   oxyCODONE 5 MG immediate release tablet        Discharge  Disposition: home   Follow-up: Dr. Walter Fuentes MD 6 weeks          This note has been electronically signed.    Krystyna Nesbitt MD  Attending: Dr. Walter Fuentes MD   August 9, 2024

## 2024-08-09 NOTE — PLAN OF CARE
Goal Outcome Evaluation:  Plan of Care Reviewed With: patient        Progress: improving  Outcome Evaluation: Received from PACU. Alert and oriented.  2 suprapubic puncture with dried drainage. Slight vaginal bleeding. Dooley catheter care done, adequate urine output , clear yellow, Tylenol and Roxicodone given for mind pain. Ambulated in the glaser with assistance, toelrated well. Good oral intake. Saline locked. Slept well. For voiding trial this morning.

## 2024-08-09 NOTE — NURSING NOTE
Reviewed AVS with patient and family, patient to go home with prajapati catheter due to failed voiding trial with urinary retention, educated on care for prajapati catheter, all questions answered, patient and family verbalized understanding, iv removed, received meds to bed.

## 2024-08-10 LAB
BH BB BLOOD EXPIRATION DATE: NORMAL
BH BB BLOOD EXPIRATION DATE: NORMAL
BH BB BLOOD TYPE BARCODE: 5100
BH BB BLOOD TYPE BARCODE: 5100
BH BB DISPENSE STATUS: NORMAL
BH BB DISPENSE STATUS: NORMAL
BH BB PRODUCT CODE: NORMAL
BH BB PRODUCT CODE: NORMAL
BH BB UNIT NUMBER: NORMAL
BH BB UNIT NUMBER: NORMAL
CROSSMATCH INTERPRETATION: NORMAL
CROSSMATCH INTERPRETATION: NORMAL
UNIT  ABO: NORMAL
UNIT  ABO: NORMAL
UNIT  RH: NORMAL
UNIT  RH: NORMAL

## 2024-08-12 LAB
LAB AP CASE REPORT: NORMAL
PATH REPORT.FINAL DX SPEC: NORMAL
PATH REPORT.GROSS SPEC: NORMAL

## (undated) DEVICE — ANTIBACTERIAL UNDYED BRAIDED (POLYGLACTIN 910), SYNTHETIC ABSORBABLE SUTURE: Brand: COATED VICRYL

## (undated) DEVICE — ENSEAL 20 CM SHAFT, LARGE JAW: Brand: ENSEAL X1

## (undated) DEVICE — DRAPE,REIN 53X77,STERILE: Brand: MEDLINE

## (undated) DEVICE — NEEDLE, QUINCKE, 18GX3.5": Brand: MEDLINE

## (undated) DEVICE — ST IRR CYSTO W/SPK 77IN LF

## (undated) DEVICE — COVER,MAYO STAND,STERILE: Brand: MEDLINE

## (undated) DEVICE — PDS II VLT 0 107CM AG ST3: Brand: ENDOLOOP

## (undated) DEVICE — PK HYST VAG 40

## (undated) DEVICE — SUT VIC 0 TIES 18IN J912G

## (undated) DEVICE — SPNG LAP 18X18IN LF STRL PK/5

## (undated) DEVICE — GLV SURG PREMIERPRO ORTHO LTX PF SZ8 BRN

## (undated) DEVICE — SUT VIC 1 CT1 36IN J947H

## (undated) DEVICE — INTENDED FOR TISSUE SEPARATION, AND OTHER PROCEDURES THAT REQUIRE A SHARP SURGICAL BLADE TO PUNCTURE OR CUT.: Brand: BARD-PARKER ® CARBON RIB-BACK BLADES

## (undated) DEVICE — DRAPE,UNDERBUTTOCKS,PCH,STERILE: Brand: MEDLINE

## (undated) DEVICE — SYRINGE, LUER LOCK, 60ML: Brand: MEDLINE

## (undated) DEVICE — CATH URETRL FLXITP POLLACK STD 5F 70CM

## (undated) DEVICE — SOL NS 500ML

## (undated) DEVICE — SOL NACL 0.9PCT 1000ML

## (undated) DEVICE — DECANTER BAG 9": Brand: MEDLINE INDUSTRIES, INC.